# Patient Record
Sex: FEMALE | Race: BLACK OR AFRICAN AMERICAN | HISPANIC OR LATINO | Employment: STUDENT | URBAN - METROPOLITAN AREA
[De-identification: names, ages, dates, MRNs, and addresses within clinical notes are randomized per-mention and may not be internally consistent; named-entity substitution may affect disease eponyms.]

---
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
## 2023-12-08 PROBLEM — Z00.00 ENCOUNTER FOR PREVENTIVE HEALTH EXAMINATION: Status: ACTIVE | Noted: 2023-12-08

## 2024-01-23 ENCOUNTER — LABORATORY RESULT (OUTPATIENT)
Age: 19
End: 2024-01-23

## 2024-01-23 ENCOUNTER — TRANSCRIPTION ENCOUNTER (OUTPATIENT)
Age: 19
End: 2024-01-23

## 2024-01-23 ENCOUNTER — APPOINTMENT (OUTPATIENT)
Dept: NEUROLOGY | Facility: CLINIC | Age: 19
End: 2024-01-23
Payer: COMMERCIAL

## 2024-01-23 DIAGNOSIS — Z78.9 OTHER SPECIFIED HEALTH STATUS: ICD-10-CM

## 2024-01-23 PROCEDURE — 99205 OFFICE O/P NEW HI 60 MIN: CPT

## 2024-01-23 RX ORDER — ASPIRIN ENTERIC COATED TABLETS 81 MG 81 MG/1
81 TABLET, DELAYED RELEASE ORAL DAILY
Refills: 0 | Status: ACTIVE | COMMUNITY

## 2024-01-23 NOTE — ASSESSMENT
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 So far, despite extensive testing, etiology is unclear but further eval is warranted to ensure appropriate treatment.  Recommendations: --TTE with bubble for PFO eval; can consider LOC/referral to Interventional cardiology pending results --Repeat hypercoag studies --Hematology referral  --30 day cardiac monitor --Continue ASA 81 mg daily --Will consider adding atorvastatin back to regimen pending lipids/LP(a)  RTC 1 month w NP Eckels if all testing complete; will call w results as able

## 2024-01-23 NOTE — PHYSICAL EXAM
[FreeTextEntry1] : Alert.  Fully oriented.  Speech and language are intact.  Cranial nerves II-XII are intact.  Motor exam reveals intact strength with individual muscle testing in bilateral upper and lower extremities.  Tone is normal.  Reflexes are slightly increased on R Hemibody compared to left.  Toes are downgoing.  Sensation is intact to light touch in distal extremities.  Finger-to-nose and heel-to-shin are intact.  Rapid alternating movements are normal in the upper and lower extremities.  Gait is normal.

## 2024-01-23 NOTE — HISTORY OF PRESENT ILLNESS
[FreeTextEntry1] : The patient is a 18 year-old female with a past medical history of stroke in March 2023.  Briefly, the patient presented to [facility] with transient right-sided numbness with slurred speech, word-finding difficulties, and right hand clumsiness/weakness.  MRI at that time showed an area of acute ischemia in the left posterior, superior frontal lobe.  CTA head/neck was negative. TTE showed a possible pulmonary AVM versus PFO though this was not confirmed on LOC.  Otherwise, echo findings were unremarkable.  DVT ultrasound of her lower extremities was negative.  She underwent a CT scan chest PE protocol as well as CT abdomen/pelvis which was negative for acute findings.  She had a transvaginal ultrasound which was negative.  She was evaluated by outpatient hematology as well as rheumatology with unrevealing evaluations per their report.  There are some results available in her patient portal as detailed below.  She was maintained on aspirin 81 mg and recommended statin therapy which was later discontinued by her PCP.  As she was on oral contraceptive pills containing estrogen at the time of the event, these have since been discontinued.  Patient denies any personal history of DVT/PE/miscarriage.  There is no family history of the same and no known clotting disorders.  She denies smoking but does admit to occasional alcohol and marijuana use.  Labs 03/2023:  A1c 5.6% Lipids: , Total cholesterol 142 TSH WNL Ptt elevated at 47.1 DRVVT neg; Hex phase phospholipid mildly elevated at 8.4 Antiphospholipid AB: neg Hemoglobin S WNL Protein S WNL D-dimer WNL SS A/B, dsDNA, Sm ABs: WNL

## 2024-01-24 LAB
ALBUMIN SERPL ELPH-MCNC: 5.3 G/DL
ALP BLD-CCNC: 51 U/L
ALT SERPL-CCNC: 12 U/L
ANION GAP SERPL CALC-SCNC: 11 MMOL/L
AST SERPL-CCNC: 16 U/L
AT III PPP CHRO-ACNC: 106 %
BILIRUB SERPL-MCNC: 0.7 MG/DL
BUN SERPL-MCNC: 14 MG/DL
CALCIUM SERPL-MCNC: 10 MG/DL
CHLORIDE SERPL-SCNC: 101 MMOL/L
CHOLEST SERPL-MCNC: 196 MG/DL
CO2 SERPL-SCNC: 25 MMOL/L
CREAT SERPL-MCNC: 0.74 MG/DL
EGFR: 119 ML/MIN/1.73M2
ESTIMATED AVERAGE GLUCOSE: 97 MG/DL
FACT VIII ACT/NOR PPP: 86 %
FOLATE SERPL-MCNC: 18.7 NG/ML
GLUCOSE SERPL-MCNC: 78 MG/DL
HBA1C MFR BLD HPLC: 5 %
HCT VFR BLD CALC: 42.7 %
HDLC SERPL-MCNC: 74 MG/DL
HGB BLD-MCNC: 14.2 G/DL
INR PPP: 0.98 RATIO
LDLC SERPL CALC-MCNC: 105 MG/DL
MCHC RBC-ENTMCNC: 26.4 PG
MCHC RBC-ENTMCNC: 33.3 GM/DL
MCV RBC AUTO: 79.5 FL
NONHDLC SERPL-MCNC: 121 MG/DL
PLATELET # BLD AUTO: 260 K/UL
POTASSIUM SERPL-SCNC: 4.2 MMOL/L
PROT C PPP CHRO-ACNC: 88 %
PROT S AG ACT/NOR PPP IA: 87 %
PROT SERPL-MCNC: 7.8 G/DL
PT BLD: 11.1 SEC
RBC # BLD: 5.37 M/UL
RBC # FLD: 13.7 %
SODIUM SERPL-SCNC: 137 MMOL/L
TRIGL SERPL-MCNC: 93 MG/DL
TSH SERPL-ACNC: 0.51 UIU/ML
VIT B12 SERPL-MCNC: 875 PG/ML
WBC # FLD AUTO: 6.18 K/UL

## 2024-01-25 LAB
APO LP(A) SERPL-MCNC: 98.8 NMOL/L
B2 GLYCOPROT1 AB SER QL: NEGATIVE
B2 GLYCOPROT1 IGG SER-ACNC: <5 SGU
B2 GLYCOPROT1 IGM SER-ACNC: <5 SMU
CARDIOLIPIN AB SER IA-ACNC: NEGATIVE
DNA PLOIDY SPEC FC-IMP: NORMAL

## 2024-01-26 ENCOUNTER — NON-APPOINTMENT (OUTPATIENT)
Age: 19
End: 2024-01-26

## 2024-01-26 ENCOUNTER — OUTPATIENT (OUTPATIENT)
Dept: OUTPATIENT SERVICES | Facility: HOSPITAL | Age: 19
LOS: 1 days | End: 2024-01-26
Payer: COMMERCIAL

## 2024-01-26 DIAGNOSIS — I63.9 CEREBRAL INFARCTION, UNSPECIFIED: ICD-10-CM

## 2024-01-26 LAB
B2 GLYCOPROT1 IGA SERPL IA-ACNC: <5 SAU
CARDIOLIPIN IGM SER-MCNC: 7.8 MPL
CARDIOLIPIN IGM SER-MCNC: <5 GPL
DEPRECATED CARDIOLIPIN IGA SER: <5 APL
PROT S FREE PPP-ACNC: 75 %

## 2024-01-26 PROCEDURE — 93306 TTE W/DOPPLER COMPLETE: CPT

## 2024-01-26 PROCEDURE — 93306 TTE W/DOPPLER COMPLETE: CPT | Mod: 26

## 2024-02-01 ENCOUNTER — APPOINTMENT (OUTPATIENT)
Dept: HEART AND VASCULAR | Facility: CLINIC | Age: 19
End: 2024-02-01
Payer: COMMERCIAL

## 2024-02-01 ENCOUNTER — NON-APPOINTMENT (OUTPATIENT)
Age: 19
End: 2024-02-01

## 2024-02-01 VITALS
WEIGHT: 90 LBS | OXYGEN SATURATION: 100 % | DIASTOLIC BLOOD PRESSURE: 81 MMHG | SYSTOLIC BLOOD PRESSURE: 116 MMHG | HEIGHT: 61 IN | BODY MASS INDEX: 16.99 KG/M2 | HEART RATE: 74 BPM

## 2024-02-01 PROCEDURE — 93000 ELECTROCARDIOGRAM COMPLETE: CPT

## 2024-02-01 PROCEDURE — 99203 OFFICE O/P NEW LOW 30 MIN: CPT | Mod: 25

## 2024-02-08 NOTE — PHYSICAL EXAM
[Well Developed] : well developed [Well Nourished] : well nourished [No Acute Distress] : no acute distress [Normal Conjunctiva] : normal conjunctiva [5th Left ICS - MCL] : palpated at the 5th LICS in the midclavicular line [Normal Rate] : normal [Rhythm Regular] : regular [Normal S1] : normal S1 [Normal S2] : normal S2 [No Murmur] : no murmurs heard [Clear Lung Fields] : clear lung fields [Good Air Entry] : good air entry [No Respiratory Distress] : no respiratory distress  [Soft] : abdomen soft [Normal Gait] : normal gait [No Edema] : no edema [No Rash] : no rash [Moves all extremities] : moves all extremities [Alert and Oriented] : alert and oriented [Click] : no click [Distant] : the heart sounds were ~L not distant

## 2024-02-08 NOTE — HISTORY OF PRESENT ILLNESS
[FreeTextEntry1] : 18 year old female with history of a stroke 3/2023, who presents for initial evaluation.  She was admitted to [facility] with transient R sided numbness, slurred speech and word finding difficulties - 3/2023.  MRI showed an area of acute ischemia.  Echo at that time showed possible pulmonary AVM vs. PFO - but this was not confirmed on LOC.  She has not seen structural heart yet.  She is off oral contraception (was on estrogen).  No other events.  No chest pain, SOB, syncope, palpitations or history of arrhythmia.  Echo 2024 1. Normal left and right ventricular size and systolic function. 2. Injection with agitated saline reveals late bubbles (after 3 heart beats), suggestive of PFO  however unable to exclude pulmonary shunt such as pulmonary AV malformation. 3. No significant valvular disease. 4. No evidence of pulmonary hypertension. 5. No pericardial effusion. 6. No prior echo is available for comparison.

## 2024-02-08 NOTE — DISCUSSION/SUMMARY
[EKG obtained to assist in diagnosis and management of assessed problem(s)] : EKG obtained to assist in diagnosis and management of assessed problem(s) [FreeTextEntry1] : 18 year old female with history of a stroke 3/2023, who presents for initial evaluation.  We discussed that there are various etiologies of stroke - one being an abnormal heart rhythm called atrial fibrillation.  We discussed the normal conduction system of the heart and what atrial fibrillation is.  While this is unlikely it should be ruled out.  As per neurology will place a 30 day monitor to assess for silent arrhythmia. She will see the structural heart team.  Follow up after she returns her monitor or sooner if needed.  SHe knows to call with any questions or concerns.

## 2024-02-08 NOTE — ADDENDUM
[FreeTextEntry1] : I, Magdi Ramos, hereby attest that the medical record entry for this patient accurately reflects signatures/notations that I made on the Date of Service in my capacity as an Attending Physician when I treated/diagnosed the above patient. I do hereby attest that this information is true, accurate and complete to the best of my knowledge and I understand that any falsification, omission, or concealment of material fact may subject me to administrative, civil, or, criminal liability. I agree with the note as written by my PA in its entirety. I was present for the entire visit and supervised the entire visit and agree with the plan as outlined.  I, Jarod Marie, am scribing for and the presence of Dr. Ramos the following sections: HPI, PMH,Family/social history, ROS, Physical Exam, Assessment / Plan.

## 2024-02-23 ENCOUNTER — TRANSCRIPTION ENCOUNTER (OUTPATIENT)
Age: 19
End: 2024-02-23

## 2024-02-26 ENCOUNTER — TRANSCRIPTION ENCOUNTER (OUTPATIENT)
Age: 19
End: 2024-02-26

## 2024-03-01 ENCOUNTER — APPOINTMENT (OUTPATIENT)
Dept: HEART AND VASCULAR | Facility: CLINIC | Age: 19
End: 2024-03-01
Payer: COMMERCIAL

## 2024-03-01 PROCEDURE — 93272 ECG/REVIEW INTERPRET ONLY: CPT

## 2024-03-14 ENCOUNTER — APPOINTMENT (OUTPATIENT)
Dept: NEUROLOGY | Facility: CLINIC | Age: 19
End: 2024-03-14

## 2024-03-15 ENCOUNTER — OUTPATIENT (OUTPATIENT)
Dept: OUTPATIENT SERVICES | Facility: HOSPITAL | Age: 19
LOS: 1 days | End: 2024-03-15
Payer: COMMERCIAL

## 2024-03-15 ENCOUNTER — RESULT REVIEW (OUTPATIENT)
Age: 19
End: 2024-03-15

## 2024-03-15 ENCOUNTER — OUTPATIENT (OUTPATIENT)
Dept: OUTPATIENT SERVICES | Facility: HOSPITAL | Age: 19
LOS: 1 days | End: 2024-03-15

## 2024-03-15 DIAGNOSIS — I63.9 CEREBRAL INFARCTION, UNSPECIFIED: ICD-10-CM

## 2024-03-15 PROCEDURE — 76376 3D RENDER W/INTRP POSTPROCES: CPT | Mod: 26

## 2024-03-15 PROCEDURE — 93312 ECHO TRANSESOPHAGEAL: CPT

## 2024-03-15 PROCEDURE — 93312 ECHO TRANSESOPHAGEAL: CPT | Mod: 26

## 2024-04-05 DIAGNOSIS — Q21.12 PATENT FORAMEN OVALE: ICD-10-CM

## 2024-04-05 DIAGNOSIS — I63.9 CEREBRAL INFARCTION, UNSPECIFIED: ICD-10-CM

## 2024-04-05 LAB
HOMOCYSTEINE LEVEL: 7.2 UMOL/L
METHYLMALONATE SERPL-SCNC: 94 NMOL/L

## 2024-04-15 ENCOUNTER — NON-APPOINTMENT (OUTPATIENT)
Age: 19
End: 2024-04-15

## 2024-05-19 ENCOUNTER — RESULT CHARGE (OUTPATIENT)
Age: 19
End: 2024-05-19

## 2024-05-20 ENCOUNTER — APPOINTMENT (OUTPATIENT)
Dept: CT IMAGING | Facility: HOSPITAL | Age: 19
End: 2024-05-20

## 2024-05-20 ENCOUNTER — OUTPATIENT (OUTPATIENT)
Dept: OUTPATIENT SERVICES | Facility: HOSPITAL | Age: 19
LOS: 1 days | End: 2024-05-20
Payer: COMMERCIAL

## 2024-05-20 ENCOUNTER — NON-APPOINTMENT (OUTPATIENT)
Age: 19
End: 2024-05-20

## 2024-05-20 ENCOUNTER — APPOINTMENT (OUTPATIENT)
Dept: CARDIOTHORACIC SURGERY | Facility: CLINIC | Age: 19
End: 2024-05-20
Payer: COMMERCIAL

## 2024-05-20 VITALS
SYSTOLIC BLOOD PRESSURE: 110 MMHG | HEART RATE: 70 BPM | BODY MASS INDEX: 17.18 KG/M2 | HEIGHT: 61 IN | OXYGEN SATURATION: 100 % | WEIGHT: 91 LBS | DIASTOLIC BLOOD PRESSURE: 59 MMHG | TEMPERATURE: 97.1 F

## 2024-05-20 DIAGNOSIS — Q25.72 CONGENITAL PULMONARY ARTERIOVENOUS MALFORMATION: ICD-10-CM

## 2024-05-20 LAB — POCT ISTAT CREATININE: 0.7 MG/DL — SIGNIFICANT CHANGE UP (ref 0.5–1.3)

## 2024-05-20 PROCEDURE — 93000 ELECTROCARDIOGRAM COMPLETE: CPT

## 2024-05-20 PROCEDURE — 75573 CT HRT C+ STRUX CGEN HRT DS: CPT

## 2024-05-20 PROCEDURE — 99204 OFFICE O/P NEW MOD 45 MIN: CPT

## 2024-05-20 PROCEDURE — 75573 CT HRT C+ STRUX CGEN HRT DS: CPT | Mod: 26

## 2024-05-20 PROCEDURE — 82565 ASSAY OF CREATININE: CPT

## 2024-05-20 PROCEDURE — 99214 OFFICE O/P EST MOD 30 MIN: CPT

## 2024-05-21 NOTE — PHYSICAL EXAM
[No Acute Distress] : no acute distress [Normal Conjunctiva] : normal conjunctiva [Normal Venous Pressure] : normal venous pressure [No Carotid Bruit] : no carotid bruit [Normal S1, S2] : normal S1, S2 [No Murmur] : no murmur [Clear Lung Fields] : clear lung fields [Good Air Entry] : good air entry [Soft] : abdomen soft [Non Tender] : non-tender [Normal Gait] : normal gait [No Edema] : no edema [No Rash] : no rash [Moves all extremities] : moves all extremities [Alert and Oriented] : alert and oriented

## 2024-05-22 NOTE — ASSESSMENT
[FreeTextEntry1] : 18F with PMH sickle cell trait, CVA in 3/2023 (no residual deficit) work up was notable for a PFO, no referred for PFO closure evaluation.   The patient is clinically stable. The LOC/ECHO was reviewed and independently interpreted by Dr. Maddox which showed Injection with agitated saline reveals late bubbles (after 3 heart beats), suggestive of PFO however unable to exclude pulmonary shunt such as pulmonary AV malformation, the results were discussed at length with the patient and her parents.  The shunt was not well visualized on LOC.  Given these findings, Dr. Maddox recommends CT Cardiac with full chest to assess for PFO vs pulm AVM. If confirmed PFO, Dr. Maddox recommends PFO closure given cardioembolic CVA with a negative event monitor and hypercoagulable workup. The plan and PFO closure were discussed at length, and she agrees to proceed, all questions answered.

## 2024-05-22 NOTE — HISTORY OF PRESENT ILLNESS
[FreeTextEntry1] : Referred by Dr. Chu/ADAM Washington.   18F with PMH sickle cell trait, CVA in 3/2023 (no residual deficit) work up was notable for a PFO, no referred for PFO closure evaluation.   Rope Score: 10  Patient presented to [facility] in [place] 2023 with right hand numbness that progressed to right facial numbness, slurred speak and word finding difficulties. CTA H/N unremarkable. MRI of the brain showed small area of acute ischemia in the left posterior frontal lobe. Transcranial doppler negative. TTE showed bubbles after IV agitated saline appear to pass 5-8 beats after opacification of RA to LV suggesting intrapulmonary shunt more likely than PFO. LOC did not show any PFO/shunt - normal study. BLE venous US negative for DVT. CTA CAP was only notable for splenomegaly, PE negative. Patient was on OCP, thus was discontinued. Patient was discharged on ASA and statin. Seen by hematology Dr. Cleo Higginbotham in 7/2023 with negative hypercoagulable work up. The patient was on oral contraceptives at this time which were stopped.   Patient saw Dr. Chu on 1/23/24 for evaluation and referred patient to hematology for repeat hypercoagulable work up and Dr. Ramos. Dr. Chu repeated hypercoagulable labs which were unremarkable.  Saw Dr. Ramos on 2/1/24, had a 30-day MCOT that was negative for Afib.   TTE with Bubble Study 1/26/24 CONCLUSIONS:  1. Normal left and right ventricular size and systolic function.  2. Injection with agitated saline reveals late bubbles (after 3 heart beats), suggestive of PFO however unable to exclude pulmonary shunt such as pulmonary AV malformation.  3. No significant valvular disease.  4. No evidence of pulmonary hypertension.  5. No pericardial effusion.  6. No prior echo is available for comparison.  LOC 3/15/24 (read by Dr. Goldberg) CONCLUSIONS:  1. Normal left and right ventricular size and systolic function.  2. No LA/RA/RESHMA/RAA thrombus seen.  3. Intracardiac shunt is present; most consistent with a small patent foramen ovale.  4. No significant valvular disease.  5. No evidence of pulmonary hypertension.  6. No pericardial effusion.  1/23/24 labs reviewed:  WBC 6.18 Hgb/Hct 14.2/42.7 Plt 260 BUN/Cr 14/0.74  Today, patient reports she is feeling well with no complaints. She denies any SOB at rest or with exertion, chest pain, palpitations, dizziness, syncope, or LE edema.  She has not had any new neurological deficits or events.   The patient lives her parents and works in an [employer].      LMP on 05/7/2024

## 2024-05-22 NOTE — PLAN
[TextEntry] : 1) CT Cardiac today 2) Follow up with neurology as scheduled 3) Continue current medication regimen and care with Nicole Washington NP / Dr. Raoul Arevalo I, Dr. Gm Maddox, personally performed the evaluation and management (E/M) services for this new patient. That E/M includes conducting the clinically appropriate initial history &/or exam, assessing all conditions, and establishing the plan of care. Today, my ZEUS, noted herewith, was here to observe my evaluation and management service for this patient & follow plan of care established by me going forward.

## 2024-05-23 PROBLEM — Q25.72 PULMONARY ARTERIOVENOUS MALFORMATION: Status: RESOLVED | Noted: 2024-05-21 | Resolved: 2024-05-23

## 2024-05-28 ENCOUNTER — NON-APPOINTMENT (OUTPATIENT)
Age: 19
End: 2024-05-28

## 2024-05-28 VITALS
HEART RATE: 70 BPM | SYSTOLIC BLOOD PRESSURE: 126 MMHG | TEMPERATURE: 98 F | OXYGEN SATURATION: 100 % | HEIGHT: 61 IN | WEIGHT: 91.05 LBS | DIASTOLIC BLOOD PRESSURE: 69 MMHG | RESPIRATION RATE: 16 BRPM

## 2024-05-29 ENCOUNTER — APPOINTMENT (OUTPATIENT)
Dept: CARDIOTHORACIC SURGERY | Facility: HOSPITAL | Age: 19
End: 2024-05-29

## 2024-05-29 ENCOUNTER — RESULT REVIEW (OUTPATIENT)
Age: 19
End: 2024-05-29

## 2024-05-29 ENCOUNTER — INPATIENT (INPATIENT)
Facility: HOSPITAL | Age: 19
LOS: 0 days | Discharge: HOME | DRG: 307 | End: 2024-05-29
Attending: INTERNAL MEDICINE | Admitting: INTERNAL MEDICINE
Payer: COMMERCIAL

## 2024-05-29 ENCOUNTER — TRANSCRIPTION ENCOUNTER (OUTPATIENT)
Age: 19
End: 2024-05-29

## 2024-05-29 ENCOUNTER — NON-APPOINTMENT (OUTPATIENT)
Age: 19
End: 2024-05-29

## 2024-05-29 VITALS
OXYGEN SATURATION: 98 % | RESPIRATION RATE: 16 BRPM | HEART RATE: 83 BPM | DIASTOLIC BLOOD PRESSURE: 67 MMHG | SYSTOLIC BLOOD PRESSURE: 101 MMHG

## 2024-05-29 DIAGNOSIS — I63.9 CEREBRAL INFARCTION, UNSPECIFIED: ICD-10-CM

## 2024-05-29 LAB
ALBUMIN SERPL ELPH-MCNC: 4.1 G/DL — SIGNIFICANT CHANGE UP (ref 3.3–5)
ALBUMIN SERPL ELPH-MCNC: 4.7 G/DL — SIGNIFICANT CHANGE UP (ref 3.3–5)
ALP SERPL-CCNC: 42 U/L — SIGNIFICANT CHANGE UP (ref 40–120)
ALP SERPL-CCNC: 51 U/L — SIGNIFICANT CHANGE UP (ref 40–120)
ALT FLD-CCNC: 6 U/L — LOW (ref 10–45)
ALT FLD-CCNC: 9 U/L — LOW (ref 10–45)
ANION GAP SERPL CALC-SCNC: 7 MMOL/L — SIGNIFICANT CHANGE UP (ref 5–17)
ANION GAP SERPL CALC-SCNC: 8 MMOL/L — SIGNIFICANT CHANGE UP (ref 5–17)
APTT BLD: 33.5 SEC — SIGNIFICANT CHANGE UP (ref 24.5–35.6)
APTT BLD: 34.8 SEC — SIGNIFICANT CHANGE UP (ref 24.5–35.6)
AST SERPL-CCNC: 14 U/L — SIGNIFICANT CHANGE UP (ref 10–40)
AST SERPL-CCNC: 14 U/L — SIGNIFICANT CHANGE UP (ref 10–40)
BILIRUB SERPL-MCNC: 0.3 MG/DL — SIGNIFICANT CHANGE UP (ref 0.2–1.2)
BILIRUB SERPL-MCNC: 0.3 MG/DL — SIGNIFICANT CHANGE UP (ref 0.2–1.2)
BLD GP AB SCN SERPL QL: NEGATIVE — SIGNIFICANT CHANGE UP
BUN SERPL-MCNC: 13 MG/DL — SIGNIFICANT CHANGE UP (ref 7–23)
BUN SERPL-MCNC: 15 MG/DL — SIGNIFICANT CHANGE UP (ref 7–23)
CALCIUM SERPL-MCNC: 8.8 MG/DL — SIGNIFICANT CHANGE UP (ref 8.4–10.5)
CALCIUM SERPL-MCNC: 9.6 MG/DL — SIGNIFICANT CHANGE UP (ref 8.4–10.5)
CHLORIDE SERPL-SCNC: 104 MMOL/L — SIGNIFICANT CHANGE UP (ref 96–108)
CHLORIDE SERPL-SCNC: 106 MMOL/L — SIGNIFICANT CHANGE UP (ref 96–108)
CO2 SERPL-SCNC: 23 MMOL/L — SIGNIFICANT CHANGE UP (ref 22–31)
CO2 SERPL-SCNC: 25 MMOL/L — SIGNIFICANT CHANGE UP (ref 22–31)
CREAT SERPL-MCNC: 0.72 MG/DL — SIGNIFICANT CHANGE UP (ref 0.5–1.3)
CREAT SERPL-MCNC: 0.76 MG/DL — SIGNIFICANT CHANGE UP (ref 0.5–1.3)
EGFR: 115 ML/MIN/1.73M2 — SIGNIFICANT CHANGE UP
EGFR: 123 ML/MIN/1.73M2 — SIGNIFICANT CHANGE UP
GLUCOSE SERPL-MCNC: 88 MG/DL — SIGNIFICANT CHANGE UP (ref 70–99)
GLUCOSE SERPL-MCNC: 95 MG/DL — SIGNIFICANT CHANGE UP (ref 70–99)
HCG SERPL-ACNC: <1 MIU/ML — SIGNIFICANT CHANGE UP
HCT VFR BLD CALC: 34.8 % — SIGNIFICANT CHANGE UP (ref 34.5–45)
HCT VFR BLD CALC: 39.6 % — SIGNIFICANT CHANGE UP (ref 34.5–45)
HGB BLD-MCNC: 11.8 G/DL — SIGNIFICANT CHANGE UP (ref 11.5–15.5)
HGB BLD-MCNC: 13.5 G/DL — SIGNIFICANT CHANGE UP (ref 11.5–15.5)
INR BLD: 0.95 — SIGNIFICANT CHANGE UP (ref 0.85–1.18)
INR BLD: 1.03 — SIGNIFICANT CHANGE UP (ref 0.85–1.18)
MAGNESIUM SERPL-MCNC: 1.9 MG/DL — SIGNIFICANT CHANGE UP (ref 1.6–2.6)
MCHC RBC-ENTMCNC: 26.5 PG — LOW (ref 27–34)
MCHC RBC-ENTMCNC: 26.5 PG — LOW (ref 27–34)
MCHC RBC-ENTMCNC: 33.9 GM/DL — SIGNIFICANT CHANGE UP (ref 32–36)
MCHC RBC-ENTMCNC: 34.1 GM/DL — SIGNIFICANT CHANGE UP (ref 32–36)
MCV RBC AUTO: 77.6 FL — LOW (ref 80–100)
MCV RBC AUTO: 78 FL — LOW (ref 80–100)
NRBC # BLD: 0 /100 WBCS — SIGNIFICANT CHANGE UP (ref 0–0)
NRBC # BLD: 0 /100 WBCS — SIGNIFICANT CHANGE UP (ref 0–0)
NT-PROBNP SERPL-SCNC: <36 PG/ML — SIGNIFICANT CHANGE UP (ref 0–300)
PHOSPHATE SERPL-MCNC: 3.4 MG/DL — SIGNIFICANT CHANGE UP (ref 2.5–4.5)
PLATELET # BLD AUTO: 222 K/UL — SIGNIFICANT CHANGE UP (ref 150–400)
PLATELET # BLD AUTO: 267 K/UL — SIGNIFICANT CHANGE UP (ref 150–400)
POTASSIUM SERPL-MCNC: 3.7 MMOL/L — SIGNIFICANT CHANGE UP (ref 3.5–5.3)
POTASSIUM SERPL-MCNC: 4.2 MMOL/L — SIGNIFICANT CHANGE UP (ref 3.5–5.3)
POTASSIUM SERPL-SCNC: 3.7 MMOL/L — SIGNIFICANT CHANGE UP (ref 3.5–5.3)
POTASSIUM SERPL-SCNC: 4.2 MMOL/L — SIGNIFICANT CHANGE UP (ref 3.5–5.3)
PROT SERPL-MCNC: 6 G/DL — SIGNIFICANT CHANGE UP (ref 6–8.3)
PROT SERPL-MCNC: 7.4 G/DL — SIGNIFICANT CHANGE UP (ref 6–8.3)
PROTHROM AB SERPL-ACNC: 10.8 SEC — SIGNIFICANT CHANGE UP (ref 9.5–13)
PROTHROM AB SERPL-ACNC: 11.7 SEC — SIGNIFICANT CHANGE UP (ref 9.5–13)
RBC # BLD: 4.46 M/UL — SIGNIFICANT CHANGE UP (ref 3.8–5.2)
RBC # BLD: 5.1 M/UL — SIGNIFICANT CHANGE UP (ref 3.8–5.2)
RBC # FLD: 13.4 % — SIGNIFICANT CHANGE UP (ref 10.3–14.5)
RBC # FLD: 13.5 % — SIGNIFICANT CHANGE UP (ref 10.3–14.5)
RH IG SCN BLD-IMP: POSITIVE — SIGNIFICANT CHANGE UP
SODIUM SERPL-SCNC: 136 MMOL/L — SIGNIFICANT CHANGE UP (ref 135–145)
SODIUM SERPL-SCNC: 137 MMOL/L — SIGNIFICANT CHANGE UP (ref 135–145)
WBC # BLD: 7.67 K/UL — SIGNIFICANT CHANGE UP (ref 3.8–10.5)
WBC # BLD: 8.16 K/UL — SIGNIFICANT CHANGE UP (ref 3.8–10.5)
WBC # FLD AUTO: 7.67 K/UL — SIGNIFICANT CHANGE UP (ref 3.8–10.5)
WBC # FLD AUTO: 8.16 K/UL — SIGNIFICANT CHANGE UP (ref 3.8–10.5)

## 2024-05-29 PROCEDURE — 71045 X-RAY EXAM CHEST 1 VIEW: CPT

## 2024-05-29 PROCEDURE — 93662 INTRACARDIAC ECG (ICE): CPT | Mod: 26,80

## 2024-05-29 PROCEDURE — 93451 RIGHT HEART CATH: CPT | Mod: 26,80

## 2024-05-29 PROCEDURE — 93308 TTE F-UP OR LMTD: CPT | Mod: 26

## 2024-05-29 PROCEDURE — 86901 BLOOD TYPING SEROLOGIC RH(D): CPT

## 2024-05-29 PROCEDURE — 93451 RIGHT HEART CATH: CPT | Mod: 26

## 2024-05-29 PROCEDURE — P9045: CPT

## 2024-05-29 PROCEDURE — 84100 ASSAY OF PHOSPHORUS: CPT

## 2024-05-29 PROCEDURE — 93005 ELECTROCARDIOGRAM TRACING: CPT

## 2024-05-29 PROCEDURE — C1769: CPT

## 2024-05-29 PROCEDURE — 71045 X-RAY EXAM CHEST 1 VIEW: CPT | Mod: 26

## 2024-05-29 PROCEDURE — 83735 ASSAY OF MAGNESIUM: CPT

## 2024-05-29 PROCEDURE — 93306 TTE W/DOPPLER COMPLETE: CPT

## 2024-05-29 PROCEDURE — 93010 ELECTROCARDIOGRAM REPORT: CPT

## 2024-05-29 PROCEDURE — C1887: CPT

## 2024-05-29 PROCEDURE — 85730 THROMBOPLASTIN TIME PARTIAL: CPT

## 2024-05-29 PROCEDURE — 93662 INTRACARDIAC ECG (ICE): CPT | Mod: 26

## 2024-05-29 PROCEDURE — C1894: CPT

## 2024-05-29 PROCEDURE — 84702 CHORIONIC GONADOTROPIN TEST: CPT

## 2024-05-29 PROCEDURE — C1760: CPT

## 2024-05-29 PROCEDURE — 85027 COMPLETE CBC AUTOMATED: CPT

## 2024-05-29 PROCEDURE — 86850 RBC ANTIBODY SCREEN: CPT

## 2024-05-29 PROCEDURE — 36415 COLL VENOUS BLD VENIPUNCTURE: CPT

## 2024-05-29 PROCEDURE — 86900 BLOOD TYPING SEROLOGIC ABO: CPT

## 2024-05-29 PROCEDURE — C1759: CPT

## 2024-05-29 PROCEDURE — 83880 ASSAY OF NATRIURETIC PEPTIDE: CPT

## 2024-05-29 PROCEDURE — 85610 PROTHROMBIN TIME: CPT

## 2024-05-29 PROCEDURE — 80053 COMPREHEN METABOLIC PANEL: CPT

## 2024-05-29 PROCEDURE — 86923 COMPATIBILITY TEST ELECTRIC: CPT

## 2024-05-29 DEVICE — GWIRE GUID  0.035INX150CM: Type: IMPLANTABLE DEVICE | Status: FUNCTIONAL

## 2024-05-29 DEVICE — SHEATH INTRODUCER TERUMO PINNACLE CORONARY 11FR X 10CM X 0.038" MINI WIRE: Type: IMPLANTABLE DEVICE | Status: FUNCTIONAL

## 2024-05-29 DEVICE — GWIRE JTIP 1.5MM .035X180CM: Type: IMPLANTABLE DEVICE | Status: FUNCTIONAL

## 2024-05-29 DEVICE — INTRO MICROPUNC 4FRX10CM SS: Type: IMPLANTABLE DEVICE | Status: FUNCTIONAL

## 2024-05-29 DEVICE — GUIDEWIRE TERUMO GLIDEWIRE STIFF STRAGHT .035" X 180CM: Type: IMPLANTABLE DEVICE | Status: FUNCTIONAL

## 2024-05-29 DEVICE — CATH DX MPA2 INFIN 5FRX100CM: Type: IMPLANTABLE DEVICE | Status: FUNCTIONAL

## 2024-05-29 DEVICE — GWIRE AMPLATZER .035 X 260CM: Type: IMPLANTABLE DEVICE | Status: FUNCTIONAL

## 2024-05-29 DEVICE — SYS VASCADE MVP VASCULAR CLOSURE 6-12F: Type: IMPLANTABLE DEVICE | Status: FUNCTIONAL

## 2024-05-29 DEVICE — CATH VERISGIGHT PRO ICE: Type: IMPLANTABLE DEVICE | Status: FUNCTIONAL

## 2024-05-29 DEVICE — GUIDEWIRE STANDARD STRAIGHT .035" X 180CM: Type: IMPLANTABLE DEVICE | Status: FUNCTIONAL

## 2024-05-29 DEVICE — SHEATH INTRODUCER TERUMO PINNACLE CORONARY 8FR X 10CM X 0.038" MINI WIRE: Type: IMPLANTABLE DEVICE | Status: FUNCTIONAL

## 2024-05-29 DEVICE — SHEATH INTRO DRYSEAL FLEX 10FRX33CM: Type: IMPLANTABLE DEVICE | Status: FUNCTIONAL

## 2024-05-29 RX ORDER — SODIUM CHLORIDE 9 MG/ML
1000 INJECTION INTRAMUSCULAR; INTRAVENOUS; SUBCUTANEOUS
Refills: 0 | Status: DISCONTINUED | OUTPATIENT
Start: 2024-05-29 | End: 2024-05-29

## 2024-05-29 RX ORDER — CEFAZOLIN SODIUM 1 G
2000 VIAL (EA) INJECTION EVERY 8 HOURS
Refills: 0 | Status: DISCONTINUED | OUTPATIENT
Start: 2024-05-29 | End: 2024-05-29

## 2024-05-29 RX ORDER — ACETAMINOPHEN 500 MG
2 TABLET ORAL
Qty: 0 | Refills: 0 | DISCHARGE
Start: 2024-05-29

## 2024-05-29 RX ORDER — HEPARIN SODIUM 5000 [USP'U]/ML
5000 INJECTION INTRAVENOUS; SUBCUTANEOUS EVERY 12 HOURS
Refills: 0 | Status: DISCONTINUED | OUTPATIENT
Start: 2024-05-29 | End: 2024-05-29

## 2024-05-29 RX ORDER — POLYETHYLENE GLYCOL 3350 17 G/17G
17 POWDER, FOR SOLUTION ORAL DAILY
Refills: 0 | Status: DISCONTINUED | OUTPATIENT
Start: 2024-05-29 | End: 2024-05-29

## 2024-05-29 RX ORDER — ACETAMINOPHEN 500 MG
650 TABLET ORAL EVERY 6 HOURS
Refills: 0 | Status: DISCONTINUED | OUTPATIENT
Start: 2024-05-29 | End: 2024-05-29

## 2024-05-29 RX ORDER — PANTOPRAZOLE SODIUM 20 MG/1
40 TABLET, DELAYED RELEASE ORAL
Refills: 0 | Status: DISCONTINUED | OUTPATIENT
Start: 2024-05-29 | End: 2024-05-29

## 2024-05-29 RX ORDER — MAGNESIUM OXIDE 400 MG ORAL TABLET 241.3 MG
800 TABLET ORAL ONCE
Refills: 0 | Status: COMPLETED | OUTPATIENT
Start: 2024-05-29 | End: 2024-05-29

## 2024-05-29 RX ORDER — ASPIRIN/CALCIUM CARB/MAGNESIUM 324 MG
0 TABLET ORAL
Refills: 0 | DISCHARGE

## 2024-05-29 RX ORDER — ALBUMIN HUMAN 25 %
250 VIAL (ML) INTRAVENOUS ONCE
Refills: 0 | Status: COMPLETED | OUTPATIENT
Start: 2024-05-29 | End: 2024-05-29

## 2024-05-29 RX ORDER — ASPIRIN/CALCIUM CARB/MAGNESIUM 324 MG
81 TABLET ORAL DAILY
Refills: 0 | Status: DISCONTINUED | OUTPATIENT
Start: 2024-05-30 | End: 2024-05-29

## 2024-05-29 RX ORDER — POTASSIUM CHLORIDE 20 MEQ
40 PACKET (EA) ORAL ONCE
Refills: 0 | Status: COMPLETED | OUTPATIENT
Start: 2024-05-29 | End: 2024-05-29

## 2024-05-29 RX ADMIN — PANTOPRAZOLE SODIUM 40 MILLIGRAM(S): 20 TABLET, DELAYED RELEASE ORAL at 13:26

## 2024-05-29 RX ADMIN — SODIUM CHLORIDE 50 MILLILITER(S): 9 INJECTION INTRAMUSCULAR; INTRAVENOUS; SUBCUTANEOUS at 09:30

## 2024-05-29 RX ADMIN — MAGNESIUM OXIDE 400 MG ORAL TABLET 800 MILLIGRAM(S): 241.3 TABLET ORAL at 13:26

## 2024-05-29 RX ADMIN — Medication 125 MILLILITER(S): at 11:49

## 2024-05-29 RX ADMIN — Medication 100 MILLIGRAM(S): at 14:45

## 2024-05-29 RX ADMIN — Medication 40 MILLIEQUIVALENT(S): at 13:26

## 2024-05-29 NOTE — DISCHARGE NOTE NURSING/CASE MANAGEMENT/SOCIAL WORK - PATIENT PORTAL LINK FT
You can access the FollowMyHealth Patient Portal offered by [facility] by registering at the following website: [email or web address]. By joining [facility] FollowMyHealth portal, you will also be able to view your health information using other applications (apps) compatible with our system.

## 2024-05-29 NOTE — DISCHARGE NOTE PROVIDER - CARE PROVIDERS DIRECT ADDRESSES
[email or web address]

## 2024-05-29 NOTE — H&P ADULT - NSHPSOCIALHISTORY_GEN_ALL_CORE
Social History  Smoker:   YES / NO       Pack Years:       When Quit:  ETOH Use:   YES / NO   Frequency / Quantity:  Ilicit Drug Use:   YES / NO  Occupation:  Assistant Devices:   None / Walker / Cane  Lives with: Social History  Smoker:   casual marijuana 1X/ per week  ETOH Use:   occasional  Ilicit Drug Use:   NO  Occupation:   Assistant Devices:   None  Lives with: boyfriend

## 2024-05-29 NOTE — H&P ADULT - NSHPREVIEWOFSYSTEMS_GEN_ALL_CORE
Review of Systems  CONSTITUTIONAL:  Denies fevers / chills, sweats, fatigue, weight loss, weight gain                                      NEURO:  Denies paresthesias, seizures, syncope, confusion                                                                                EYES:  Denies blurry vision, discharge, pain, loss of vision                                                                                    ENMT:  Denies difficulty hearing, vertigo, dysphagia, epistaxis, recent dental work                                       CV:  Denies chest pain, palpitations, CARLOS, orthopnea                                                                                          RESPIRATORY:  Denies wheezing, SOB, cough / sputum, hemoptysis                                                                GI:  Denies nausea, vomiting, diarrhea, constipation, melena, difficulty swallowing                                             : Denies hematuria, dysuria, urgency, incontinence                                                                                         MUSCULOSKELETAL:  Denies arthritis, joint swelling, muscle weakness                                                             SKIN/BREAST:  Denies rash, itching, hair loss, masses                                                                                            PSYCH:  Denies depression, anxiety, suicidal ideation                                                                                               HEME/LYMPH:  Denies bruises easily, enlarged lymph nodes, tender lymph nodes                                        ENDOCRINE:  Denies cold intolerance, heat intolerance, polydipsia

## 2024-05-29 NOTE — BRIEF OPERATIVE NOTE - OPERATION/FINDINGS
ICE, no intervention    Access:  Right femoral vein - 8 F, angioseal X1  Left femoral vein - 10 F - angioseal X1   ICE evaluation    Access:  Right femoral vein - 8 F, angioseal X1  Left femoral vein - 10 F - angioseal X1

## 2024-05-29 NOTE — H&P ADULT - NSHPPHYSICALEXAM_GEN_ALL_CORE
Physical Exam  CONSTITUTIONAL: well appearing, NAD  NEURO: no focal deficits noted                   EYES: PERRLA  ENMT: neck supple   CV: RRR, no m/r/g  RESPIRATORY: CTA, breathing comfortably on RA  GI: +BS, NT/ND  : No castillo   MUSKULOSKELETAL: no peripheral edema or calf tenderness   SKIN / BREAST: no rashes noted

## 2024-05-29 NOTE — DISCHARGE NOTE PROVIDER - NSDCCPCAREPLAN_GEN_ALL_CORE_FT
PRINCIPAL DISCHARGE DIAGNOSIS  Diagnosis: Patent foramen ovale  Assessment and Plan of Treatment:

## 2024-05-29 NOTE — DISCHARGE NOTE PROVIDER - NSDCACTIVITY_GEN_ALL_CORE
Return to Work/School allowed/Do not drive or operate machinery/Showering allowed/Do not make important decisions/Stairs allowed/Walking - Indoors allowed/Walking - Outdoors allowed/Follow Instructions Provided by your Surgical Team

## 2024-05-29 NOTE — DISCHARGE NOTE PROVIDER - NSDCFUSCHEDAPPT_GEN_ALL_CORE_FT
[facility]ina Chu Physician Count includes the [facility]  NEUROLOGY [address]  Scheduled Appointment: 05/30/2024

## 2024-05-29 NOTE — H&P ADULT - NSICDXPASTMEDICALHX_GEN_ALL_CORE_FT
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

## 2024-05-29 NOTE — DISCHARGE NOTE PROVIDER - HOSPITAL COURSE
Patient discussed pre/post procedure with Dr. Maddox    Operation Date: 5/29 ICE    Primary Surgeon/Attending MD: Dr. Maddox    Referring Physician: Dr. Nicole Whittington  _ _ _ _ _ _ _ _ _ _ _ _  HOSPITAL COURSE:    18 year old female with no significant PMHx who presented to OSH on 3/2023 with right sided numbness, slurred speech, and word finding difficulties, found to have a CVA. 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 PFO. Outpatient follow up TTE also suspicious for a PFO. She was referred to structural heart (Dr. Maddox) and deemed a good candidate for intervention. On 5/29 she presented to [facility] for planned procedure. In the OR intracardiac echocardiogram was negative for PFO. After anesthesia patient brought briefly to the CTICU for monitoring. Post procedure limited TTE completed and negative for pericardial effuson. As per Dr. Maddox she was stable for discharge later on 5/29/24 on her home ASA.     _ _ _ _ _ _ _ _ _ _ _ _  Physical Exam  CONSTITUTIONAL: Well appearing in NAD assessed laying comfortably in bed   NEURO: A&OX3. No focal deficits noted, moving bilateral upper and lower extremities                    CV: RRR, no murmurs, rubs, gallops  RESPIRATORY: Clear to auscultation bilateral posterior lung fields, no wheezes, rales, rhonchi   GI: +BS, NT/ND  MUSKULOSKELETAL: No peripheral edema or calf tenderness. Full strength and ROM bilateral upper and lower extremities   VASCULAR: Bilateral distal pulses 2+  INCISIONS: bilateral groins soft, no hematomas  _ _ _ _ _ _ _ _ _ _ _ _  REMOVAL CHECKLIST:        [ N/A] Epicardial wires          [ N/A] Stitches/tie downs,   If no, why? ______          [ N/A] PICC/Midline,   If no, why? ________  _ _ _ _ _ _ _ _ _ _ _ _  RELEVANT LABS/IMAGING:    < from: TTE Echo Complete w/o Contrast w/ Doppler (05.29.24 @ 09:57) >    CONCLUSIONS:     1. Limited study obtained for evaluation of pericardial effusion.   2. Normal left ventricular size.   3. Normal left ventricular systolic function.   4. Probably normal right ventricular systolic function.   5. No pericardial effusion.    < end of copied text >    _ _ _ _ _ _ _ _ _ _ _ _  Over 35 minutes was spent with the patient reviewing the discharge material including medications, follow up appointments, recovery, concerning symptoms, and how to contact their health care providers if they have questions

## 2024-05-29 NOTE — H&P ADULT - HISTORY OF PRESENT ILLNESS
18 year old female with no significant PMHx who presented to [facility] on 3/2023 with right sided numbness, slurred speech, and word finding difficulties, found to have a CVA. 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 PFO. Outpatient follow up TTE confirmed a PFO. She was referred to structural heart (Dr. Maddox) and deemed a good candidate for intervention. On 5/29/24 she presents to [facility] for planned PFO closure. She denies chest pain, fever, chills, nausea, vomiting, SOB.    Patient seen in same day holding area; Reports no changes to PMHx or medications since last seen by our team. Denies acute or current SOB, chest pain, palpitation, N/V/D, fever/chills, recent illness, or any other concerning symptoms.

## 2024-05-29 NOTE — DISCHARGE NOTE PROVIDER - NSDCMRMEDTOKEN_GEN_ALL_CORE_FT
acetaminophen 325 mg oral tablet: 2 tab(s) orally every 6 hours As needed Mild Pain (1 - 3)  aspirin 81 mg oral delayed release capsule: orally once a day

## 2024-05-29 NOTE — DISCHARGE NOTE PROVIDER - CARE PROVIDER_API CALL
Gm Maddox  Interventional Cardiology  [address]  [place] [address]  Phone: [phone]  Fax: [phone]  Follow Up Time:

## 2024-05-29 NOTE — H&P ADULT - ASSESSMENT
Assessment:    18 year old female with no significant PMHx who presented to [facility] on 3/2023 with right sided numbness, slurred speech, and word finding difficulties, found to have a CVA. 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 PFO. Outpatient follow up TTE confirmed a PFO. She was referred to structural heart (Dr. Maddox) and deemed a good candidate for intervention. On 5/29/24 she presents to [facility] for planned PFO closure.    Plan:  - Case discussed with Dr. Maddox  - To proceed with PFO closure  - To [facility] post op for recovery  - Post op labs, TTE, CXR, EKG  - Possibly discharge tonight pending clinical status  - Admit under Dr. Maddox  via same day surgery. Consent signed, placed on chart.  Risks/benefits reviewed, patient understands and agrees. T&S ordered and blood products placed on hold for OR.  To [facility]  post-op.

## 2024-05-29 NOTE — PRE-ANESTHESIA EVALUATION ADULT - NSANTHOSAYNRD_GEN_A_CORE
No. DAR screening performed.  STOP BANG Legend: 0-2 = LOW Risk; 3-4 = INTERMEDIATE Risk; 5-8 = HIGH Risk

## 2024-05-30 ENCOUNTER — APPOINTMENT (OUTPATIENT)
Dept: NEUROLOGY | Facility: CLINIC | Age: 19
End: 2024-05-30
Payer: COMMERCIAL

## 2024-05-30 PROBLEM — I63.9 CEREBRAL INFARCTION, UNSPECIFIED: Chronic | Status: ACTIVE | Noted: 2024-05-28

## 2024-05-30 PROCEDURE — 99214 OFFICE O/P EST MOD 30 MIN: CPT | Mod: 95

## 2024-05-31 ENCOUNTER — TRANSCRIPTION ENCOUNTER (OUTPATIENT)
Age: 19
End: 2024-05-31

## 2024-05-31 NOTE — HISTORY OF PRESENT ILLNESS
[Home] : at home, [unfilled] , at the time of the visit. [Medical Office: ([facility])___] : at the medical office located in  [Verbal consent obtained from patient] : the patient, [unfilled] [FreeTextEntry1] : Video function of visit was not working properly so conversation was had via phone  The patient is a 18 year-old female with a past medical history of stroke in March 2023. She presents for follow-up.  5/30/2024 Since her last visit, the patient underwent 30 day cardiac monitoring and repeat hypercoag studies which were negative. She was sent for PFO closure but during intracardiac echo, no PFO was found. She is pending CTA chest for eval of pulmonary AVM. She remains on aspirin and atorvastatin 10 mg daily (, Lipoprotein A 98.8). She is tolerating the medications. She has had no recurrent stroke-like symptoms. Of note, there was no pulmonary AVM on recent cardiac CT.  1/2024 Briefly, the patient presented to with transient right-sided numbness with slurred speech, word-finding difficulties, and right hand clumsiness/weakness. MRI at that time showed an area of acute ischemia in the left posterior, superior frontal lobe. CTA head/neck was negative. TTE showed a possible pulmonary AVM versus PFO though this was not confirmed on LOC. Otherwise, echo findings were unremarkable. DVT ultrasound of her lower extremities was negative. She underwent a CT scan chest PE protocol as well as CT abdomen/pelvis which was negative for acute findings. She had a transvaginal ultrasound which was negative. She was evaluated by outpatient hematology as well as rheumatology with unrevealing evaluations per their report. There are some results available in her patient portal as detailed below. She was maintained on aspirin 81 mg and recommended statin therapy which was later discontinued by her PCP. As she was on oral contraceptive pills containing estrogen at the time of the event, these have since been discontinued. Patient denies any personal history of DVT/PE/miscarriage. There is no family history of the same and no known clotting disorders. She denies smoking but does admit to occasional alcohol and marijuana use.

## 2024-05-31 NOTE — ASSESSMENT
[FreeTextEntry1] : The patient is a 18 year-old female with a past medical history of stroke in March 2023. Stroke remains cryptogenic to date despite extensive testing. There was no PFO appreciated on intracardiac echocardiogram. I will clarify if cardiac CT is good enough to exclude pulmonary AVM as a potential source or if CTA chest is necessary.  I will discuss management of hyperlipidemia in young patients with Dr. Pratt to try to get her off statin since she is only 18 years old. Rec continuing aspirin, statin therapy for now.  ADDENDUM: Reviewed CTA cardiac with radiology; Dr. Maddox extended field of the study to include all of lungs. No CTA chest is needed.

## 2024-06-01 DIAGNOSIS — Z79.82 LONG TERM (CURRENT) USE OF ASPIRIN: ICD-10-CM

## 2024-06-01 DIAGNOSIS — Z86.73 PERSONAL HISTORY OF TRANSIENT ISCHEMIC ATTACK (TIA), AND CEREBRAL INFARCTION WITHOUT RESIDUAL DEFICITS: ICD-10-CM

## 2024-06-01 DIAGNOSIS — Q21.12 PATENT FORAMEN OVALE: ICD-10-CM

## 2024-06-01 DIAGNOSIS — Z53.09 PROCEDURE AND TREATMENT NOT CARRIED OUT BECAUSE OF OTHER CONTRAINDICATION: ICD-10-CM

## 2024-06-07 ENCOUNTER — APPOINTMENT (OUTPATIENT)
Dept: CARDIOTHORACIC SURGERY | Facility: CLINIC | Age: 19
End: 2024-06-07
Payer: COMMERCIAL

## 2024-06-07 PROCEDURE — 99213 OFFICE O/P EST LOW 20 MIN: CPT

## 2024-06-07 NOTE — RESULTS/DATA
[TextEntry] : EKG done 05/20/2024   TTE with Bubble Study 1/26/24 CONCLUSIONS:  1. Normal left and right ventricular size and systolic function.  2. Injection with agitated saline reveals late bubbles (after 3 heart beats), suggestive of PFO however unable to exclude pulmonary shunt such as pulmonary AV malformation.  3. No significant valvular disease.  4. No evidence of pulmonary hypertension.  5. No pericardial effusion.  6. No prior echo is available for comparison.  LOC 3/15/24 (read by Dr. Goldberg) CONCLUSIONS:  1. Normal left and right ventricular size and systolic function.  2. No LA/RA/RESHMA/RAA thrombus seen.  3. Intracardiac shunt is present; most consistent with a small patent foramen ovale.  4. No significant valvular disease.  5. No evidence of pulmonary hypertension.  6. No pericardial effusion.  1/23/24 labs reviewed:  WBC 6.18 Hgb/Hct 14.2/42.7 Plt 260 BUN/Cr 14/0.74  Cardiac CTA 5/20/24 IMPRESSION: Cardiac: 1.  Please note this study was not optimized for the evaluation of the coronary arteries. 2.  Mid-LCx, diagonal branches, and RPL are not well-visualized. 3.  Probably normal proximal to mid RCA and distal LAD 4.  Remaining coronary segments appear normal. 5.  Possible small PFO.  Non-cardiac: 1. No pulmonary arteriovenous malformation is visualized. 2. There is a 0.8 cm shunt between the right portal vein and right hepatic vein in the liver. Shunts such as this are usually not hemodynamically significant.  TTE 5/29/24:  CONCLUSIONS:  1. Limited study obtained for evaluation of pericardial effusion.  2. Normal left ventricular size.  3. Normal left ventricular systolic function.  4. Probably normal right ventricular systolic function.  5. No pericardial effusion.

## 2024-06-07 NOTE — PLAN
[TextEntry] : - continue current medication regimen.  - follow up with Dr. Chu as scheduled for post-stroke care and management.  - return to [facility] as needed.

## 2024-06-07 NOTE — ASSESSMENT
[FreeTextEntry1] : 18F with PMH sickle cell trait, CVA in 3/2023 (no residual deficit) work up was notable for a PFO, underwent for possible PFO closure on 5/29/24 with no PFO identified, who presents for her post hospital discharge follow up.   The patient is clinically stable, recovering well, back to her baseline activity with no complain. She has refrained from swimming given healing access sites, advised to continue to refrain until scab falls off and sites are completely closed. Procedure findings and post procedure TTE were reviewed with patient. She has seen Dr. Chu the day after her procedure and will continue to follow up with her for her stroke management. Follow up in [facility] as needed.

## 2024-06-07 NOTE — HISTORY OF PRESENT ILLNESS
[FreeTextEntry1] : Referred by Dr. Chu/ADAM Washington.   18F with PMH sickle cell trait, CVA in 3/2023 (no residual deficit) work up was notable for a PFO, underwent for possible PFO closure on 5/29/24 with no PFO identified, who presents for her post hospital discharge follow up.   Patient presented to [facility] in [place] 2023 with right hand numbness that progressed to right facial numbness, slurred speak and word finding difficulties. CTA H/N unremarkable. MRI of the brain showed small area of acute ischemia in the left posterior frontal lobe. Transcranial doppler negative. TTE showed bubbles after IV agitated saline appear to pass 5-8 beats after opacification of RA to LV suggesting intrapulmonary shunt more likely than PFO. LOC did not show any PFO/shunt - normal study. BLE venous US negative for DVT. CTA CAP was only notable for splenomegaly, PE negative. Patient was on OCP, thus was discontinued. Patient was discharged on ASA and statin. Seen by hematology Dr. Cleo Higginbotham in 7/2023 with negative hypercoagulable work up. The patient was on oral contraceptives at this time which were stopped.   Patient saw Dr. Chu on 1/23/24 for evaluation and referred patient to hematology for repeat hypercoagulable work up and Dr. Ramos. Dr. Chu repeated hypercoagulable labs which were unremarkable.  Saw Dr. Ramos on 2/1/24, had a 30-day MCOT that was negative for Afib.   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    On 5/29/24, [facility] her planned PFO procedure. Intracardiac echocardiogram bubble study was negative. Multiple attempts to cross the septum/PFO were made but was unsuccessful. Decision was made to abort the procedure given negative bubble study, no evidence of interatrial septal defect. Post procedure TTE showed normal BIV function with no pericardial effusion. Patient was discharge home later in the day on her home Aspirin.   Today, patient reports feeling back, back to her normal activity, access sites are scabbed over. No CV complain and no new neurological complain. She has since had a follow up with Dr. Chu.

## 2024-06-07 NOTE — REASON FOR VISIT
[Home] : at home, [unfilled] , at the time of the visit. [Medical Office: ([facility])___] : at the medical office located in  [Structural Heart and Valve Disease] : structural heart and valve disease [Patient] : the patient [Self] : self

## 2024-06-20 ENCOUNTER — APPOINTMENT (OUTPATIENT)
Dept: NEUROLOGY | Facility: CLINIC | Age: 19
End: 2024-06-20

## 2024-06-21 PROBLEM — Q21.12 PATENT FORAMEN OVALE: Chronic | Status: ACTIVE | Noted: 2024-05-28

## (undated) DEVICE — SUT VICRYL 2-0 27" CT-1

## (undated) DEVICE — DRAPE ULTRASOUND PROBE COVER CATH FAMILY CONNECTOR

## (undated) DEVICE — CUFF FINGER CLEARSIGHT LG

## (undated) DEVICE — WARMING BLANKET LOWER ADULT

## (undated) DEVICE — VENODYNE/SCD SLEEVE CALF MEDIUM

## (undated) DEVICE — ELCTR ZOLL DEFIBRILLATOR PAD NO REPLACEMENT

## (undated) DEVICE — DRAPE ULTRASOUND TRANSDUCER COVER